# Patient Record
Sex: FEMALE | Race: WHITE | Employment: OTHER | URBAN - METROPOLITAN AREA
[De-identification: names, ages, dates, MRNs, and addresses within clinical notes are randomized per-mention and may not be internally consistent; named-entity substitution may affect disease eponyms.]

---

## 2021-12-10 ENCOUNTER — OUTSIDE SERVICES (OUTPATIENT)
Dept: PRIMARY CARE CLINIC | Age: 86
End: 2021-12-10

## 2021-12-10 DIAGNOSIS — R29.6 FREQUENT FALLS: ICD-10-CM

## 2021-12-10 DIAGNOSIS — S32.591D CLOSED FRACTURE OF MULTIPLE RAMI OF RIGHT PUBIS WITH ROUTINE HEALING, SUBSEQUENT ENCOUNTER: Primary | ICD-10-CM

## 2021-12-10 DIAGNOSIS — I10 HYPERTENSION, UNSPECIFIED TYPE: ICD-10-CM

## 2021-12-11 NOTE — PROGRESS NOTES
Progress Notes  Jane Meyers CNP    Visit Date: 12/10/21    Willem Murphy is a 80 y.o. female being seen for her weekly follow up. Location of visit: [facility]    HPI:  New today: She has some pain in pelvic region when she moves. At rest she has no pain. No SOB. She remarks it is nice here. Admitted due to fall at home with fracture of right pubic rami. She has no on acetaminophen for pain and no other medication currently. I spoke with her 2 sons who are visiting today and one son is petitioning for power of  and durable power of  for health care. Her daughter may currently have power of  and durable power of  for health care. Situation is not completely clear as all paperwork not present. Staff reports adult protective services are involved. Review of Systems  Constitutional: Negative for activity change, appetite change, chills, diaphoresis and fever. HENT: Negative for congestion. Respiratory: Negative for cough and shortness of breath. Cardiovascular: Negative for chest pain and palpitations. Gastrointestinal: Negative for diarrhea, nausea and vomiting. Genitourinary: Negative for hematuria. Musculoskeletal: Negative for arthralgias and myalgias. Skin: Negative for rash and wound. Neurological: Negative for weakness and headaches. Psychiatric/Behavioral: Negative for agitation, dysphoric mood and sleep disturbance. The patient is not nervous/anxious. Patient does have some confusion but is pleasant and appears able to make decisions. Although she could not tell me the date, month or season. Physical Exam  Vitals reviewed. Constitutional:  General: She is not in acute distress. HENT:  Head: Normocephalic and atraumatic. Nose: Tip of nose is scabbed over and appears to have a skin lesion. Eyes:  General:  Right eye: No discharge. Left eye: No discharge. Cardiovascular:  Rate and Rhythm: Normal rate and regular rhythm.   Pulmonary:  Effort: Pulmonary effort is normal. No respiratory distress. Breath sounds: Normal breath sounds. Abdominal:  Palpations: Abdomen is soft. Tenderness: There is no abdominal tenderness. Musculoskeletal:  Right lower leg: No edema. Left lower leg: No edema. Skin:  General: Skin tear right elbow scabbed over  Neurological:  Mental Status: She is alert. Comments: Oriented to person and place but not time today. She did know he relatives and expressed it would be OK if son helped take of her. ASSESSMENT:    Diagnosis Orders  1. Closed fracture of right pubic rami, subsequent encounter  2. Frequent falls  3. Hypertension is listed on hospital paperwork and is well controlled at facility        PLAN:  1. Closed fracture of right pubic rami, subsequent encounter  - Continue physical therapy and advance as tolerated  2. Frequent falls  - Continue physical therapy and advance as tolerated  3.  Hypertension is listed on hospital paperwork and is well controlled at facility  - monitor every shift    Discharge planning when patient can safely travel

## 2021-12-16 ENCOUNTER — OUTSIDE SERVICES (OUTPATIENT)
Dept: PRIMARY CARE CLINIC | Age: 86
End: 2021-12-16

## 2021-12-16 DIAGNOSIS — S32.509D CLOSED NONDISPLACED FRACTURE OF PUBIS WITH ROUTINE HEALING, UNSPECIFIED LATERALITY, SUBSEQUENT ENCOUNTER: ICD-10-CM

## 2021-12-16 DIAGNOSIS — I10 HYPERTENSION, UNSPECIFIED TYPE: Chronic | ICD-10-CM

## 2021-12-16 ASSESSMENT — ENCOUNTER SYMPTOMS
NAUSEA: 0
SHORTNESS OF BREATH: 0
DIARRHEA: 0
COUGH: 0
VOMITING: 0

## 2021-12-16 NOTE — PROGRESS NOTES
Arabella Ignacio is a 80 y.o. female being seen for her weekly follow up. Location of visit: [facility]    HPI:  Pt seen on 12-14-21  Late note entry  New today:Pt denies any pain today. No new issues per nursing. Continues with therapy for weakness- pelvic fracture. Staff report that POA may be changing and APS is involved. Review of Systems   Constitutional: Negative for activity change, appetite change, chills, diaphoresis and fever. HENT: Negative for congestion. Respiratory: Negative for cough and shortness of breath. Cardiovascular: Negative for chest pain and palpitations. Gastrointestinal: Negative for diarrhea, nausea and vomiting. Genitourinary: Negative for hematuria. Musculoskeletal: Negative for arthralgias and myalgias. Skin: Negative for rash and wound. Neurological: Negative for weakness and headaches. Psychiatric/Behavioral: Negative for agitation, dysphoric mood and sleep disturbance. The patient is not nervous/anxious. Physical Exam  Vitals reviewed. Constitutional:       General: She is not in acute distress. HENT:      Head: Normocephalic and atraumatic. Nose: No congestion or rhinorrhea. Eyes:      General:         Right eye: No discharge. Left eye: No discharge. Cardiovascular:      Rate and Rhythm: Normal rate and regular rhythm. Pulmonary:      Effort: Pulmonary effort is normal. No respiratory distress. Breath sounds: Normal breath sounds. Abdominal:      Palpations: Abdomen is soft. Tenderness: There is no abdominal tenderness. Musculoskeletal:      Right lower leg: No edema. Left lower leg: No edema. Skin:     General: Skin is warm and dry. Neurological:      Mental Status: She is alert. Mental status is at baseline. ASSESSMENT:   Diagnosis Orders   1. Hypertension, unspecified type     2.  Closed nondisplaced fracture of pubis with routine healing, unspecified laterality, subsequent encounter         PLAN:    1. Hypertension, unspecified type  Assessment & Plan:   Well-controlled, continue current medications  2.  Closed nondisplaced fracture of pubis with routine healing, unspecified laterality, subsequent encounter  Assessment & Plan:   continue therapy